# Patient Record
Sex: MALE | Race: BLACK OR AFRICAN AMERICAN
[De-identification: names, ages, dates, MRNs, and addresses within clinical notes are randomized per-mention and may not be internally consistent; named-entity substitution may affect disease eponyms.]

---

## 2017-02-01 NOTE — EMERGENCY ROOM VISIT NOTE
History


First contact with patient:  07:16


Chief Complaint:  VOMITING


Stated Complaint:  VOMITING AND PERSISTENT NAUSEA


Nursing Triage Summary:  


Pt states "I had a sore throat and I took this sore throat spray.  My 

girlfriend 


told me to swallow it and now I have been throwing up and diarrhea."  Pt 

reports 


symptoms started at 0100 this am.





History of Present Illness


The patient is a 21 year old male who presents to the Emergency Department by 

private vehicle for evaluation of his nausea, vomiting, and diarrhea.  He 

reports that last evening he had a sore throat.  He used Cepacol spray in his 

throat.  Approximate one hour later at 1 AM he developed nausea, vomiting, and 

diarrhea.  He's had persistent symptoms since.  The patient denies any fevers 

or chills.  He denies any headaches, dizziness, lightheadedness, hematemesis, 

hematochezia, melena, hematuria, or dysuria.  He denies any pain rating his 

discomfort a 0/10.  He reports no recent long distance travel, consumption of 

raw/undercooked foods, drinking from poor water sources, or recent antibiotic 

use.  He denies any recent sick contacts.  The patient reports no previous 

abdominal surgeries.





Review of Systems


A complete 10-point Review of Systems was discussed with the patient, with 

pertinent positives and negatives listed in the History of Present Illness. All 

remaining Review of Systems questions can be considered negative unless 

otherwise specified.





Social History


Smoking Status:  Never Smoker


Smokeless Tobacco Use:  No


Drug Use:  none


Marital Status:  single


Housing Status:  lives with roommate


Occupation Status:  [employer] student





Current/Historical Medications


Scheduled PRN


Ondasetron Odt (Zofran Odt), 1 TAB SL Q6 PRN for Nausea or Vomiting





Allergies


Coded Allergies:  


     No Known Allergies (Unverified , 2/1/17)





Physical Exam


Vital Signs











  Date Time  Temp Pulse Resp B/P Pulse Ox O2 Delivery O2 Flow Rate FiO2


 


2/1/17 10:45  105 17 123/85 100 Room Air  


 


2/1/17 09:18  101 16 149/100 99 Room Air  


 


2/1/17 07:13 37.1 102 16 166/91 98 Room Air  








Pain Rating (0-10):  0





Physical Exam


VITAL SIGNS - Vital signs and nursing notes were reviewed.


GENERAL - 21-year-old male appearing his stated age who is in no acute 

distress. Communicates well with provider and answers questions appropriately.


HEAD - NC/AT.


EYES - PERRL with EOMI bilaterally. Sclera anicteric. Palpebral conjunctiva 

pink and moist with no injection noted.


EARS - No deformities of external structures noted on gross examination 

bilaterally. No pain elicited with palpation of the tragus bilaterally. 

External auditory canals without discharge or otorrhea. Tympanic membranes 

pearly gray without retraction or bulging.


NOSE - Midline and without cyanosis. No epistaxis or purulent drainage noted. 

Septum midline without deviation or septal hematoma noted.


MOUTH/OROPHARYNX - Without perioral cyanosis. Buccal mucosa pink and moist and 

without leukoplakia. Tongue midline with equal elevation of palate bilaterally. 

No tonsillar hypertrophy, erythema, or exudates noted. Good dentition noted.


NECK - Neck with FROM. Supple to palpation. No nuchal rigidity.


LUNGS - Chest wall symmetric without accessory muscle use, intercostals 

retractions, or central cyanosis. Normal vesicular breath sounds CTA B/L. No 

wheezes, rales, or rhonchi appreciated.


CARDIAC - RRR with S1/S2. No murmur, rubs, or gallops appreciated.


ABDOMEN - Abdominal contour obese and without pulsations or visible masses. BS 

normoactive all four quadrants. No tenderness to palpation appreciated 

throughout. No guarding. No Rebound Tenderness. Negative Rovsing's. Negative 

Garcia's. No palpable masses, hepatosplenomegaly, or ascites noted.


PSYCH - A&Ox3 and cooperates fully with examiner. Pt is very pleasant and 

interacts well with examiner.





Medical Decision & Procedures


Laboratory Results


2/1/17 07:35








Red Blood Count 4.98, Mean Corpuscular Volume 89.6, Mean Corpuscular Hemoglobin 

30.3, Mean Corpuscular Hemoglobin Concent 33.9, Mean Platelet Volume 11.0, 

Neutrophils (%) (Auto) 71.1, Lymphocytes (%) (Auto) 20.4, Monocytes (%) (Auto) 

6.9, Eosinophils (%) (Auto) 1.2, Basophils (%) (Auto) 0.2, Neutrophils # (Auto) 

8.91, Lymphocytes # (Auto) 2.56, Monocytes # (Auto) 0.87, Eosinophils # (Auto) 

0.15, Basophils # (Auto) 0.03





2/1/17 07:35

















Test


  2/1/17


07:35 2/1/17


08:50


 


White Blood Count


  12.55 K/uL


(4.8-10.8) 


 


 


Red Blood Count


  4.98 M/uL


(4.7-6.1) 


 


 


Hemoglobin


  15.1 g/dL


(14.0-18.0) 


 


 


Hematocrit 44.6 % (42-52)  


 


Mean Corpuscular Volume


  89.6 fL


() 


 


 


Mean Corpuscular Hemoglobin


  30.3 pg


(25-34) 


 


 


Mean Corpuscular Hemoglobin


Concent 33.9 g/dl


(32-36) 


 


 


Platelet Count


  242 K/uL


(130-400) 


 


 


Mean Platelet Volume


  11.0 fL


(7.4-10.4) 


 


 


Neutrophils (%) (Auto) 71.1 %  


 


Lymphocytes (%) (Auto) 20.4 %  


 


Monocytes (%) (Auto) 6.9 %  


 


Eosinophils (%) (Auto) 1.2 %  


 


Basophils (%) (Auto) 0.2 %  


 


Neutrophils # (Auto)


  8.91 K/uL


(1.4-6.5) 


 


 


Lymphocytes # (Auto)


  2.56 K/uL


(1.2-3.4) 


 


 


Monocytes # (Auto)


  0.87 K/uL


(0.11-0.59) 


 


 


Eosinophils # (Auto)


  0.15 K/uL


(0-0.5) 


 


 


Basophils # (Auto)


  0.03 K/uL


(0-0.2) 


 


 


RDW Standard Deviation


  38.1 fL


(36.4-46.3) 


 


 


RDW Coefficient of Variation


  11.8 %


(11.5-14.5) 


 


 


Immature Granulocyte % (Auto) 0.2 %  


 


Immature Granulocyte # (Auto)


  0.03 K/uL


(0.00-0.02) 


 


 


Anion Gap


  10.0 mmol/L


(3-11) 


 


 


Est Creatinine Clear Calc


Drug Dose 131.4 ml/min 


  


 


 


Estimated GFR (


American) 112.2 


  


 


 


Estimated GFR (Non-


American 96.8 


  


 


 


BUN/Creatinine Ratio 12.1 (10-20)  


 


Calcium Level


  9.3 mg/dl


(8.5-10.1) 


 


 


Magnesium Level


  2.0 mg/dl


(1.8-2.4) 


 


 


Total Bilirubin


  1.4 mg/dl


(0.2-1) 


 


 


Aspartate Amino Transf


(AST/SGOT) 11 U/L (15-37) 


  


 


 


Alanine Aminotransferase


(ALT/SGPT) 18 U/L (12-78) 


  


 


 


Alkaline Phosphatase


  98 U/L


() 


 


 


Total Protein


  8.2 gm/dl


(6.4-8.2) 


 


 


Albumin


  4.1 gm/dl


(3.4-5.0) 


 


 


Globulin


  4.1 gm/dl


(2.5-4.0) 


 


 


Albumin/Globulin Ratio 1.0 (0.9-2)  


 


Lipase


  127 U/L


() 


 


 


Urine Color  YELLOW 


 


Urine Appearance  CLEAR (CLEAR) 


 


Urine pH  7.0 (4.5-7.5) 


 


Urine Specific Gravity


  


  1.009


(1.000-1.030)


 


Urine Protein  NEG (NEG) 


 


Urine Glucose (UA)  NEG (NEG) 


 


Urine Ketones  NEG (NEG) 


 


Urine Occult Blood  NEG (NEG) 


 


Urine Nitrite  NEG (NEG) 


 


Urine Bilirubin  NEG (NEG) 


 


Urine Urobilinogen  NEG (NEG) 


 


Urine Leukocyte Esterase  NEG (NEG) 











Medications Administered











 Medications


  (Trade)  Dose


 Ordered  Sig/Nandini


 Route  Start Time


 Stop Time Status Last Admin


Dose Admin


 


 Sodium Chloride


  (Nss 1000ml)  1,000 ml @ 


 999 mls/hr  Q1H1M STAT


 IV  2/1/17 07:46


 2/1/17 08:46 DC 2/1/17 07:46


999 MLS/HR


 


 Ondansetron HCl 4


 mg  4 mg  NOW  STAT


 IV  2/1/17 07:46


 2/1/17 07:48 DC 2/1/17 07:53


4 MG


 


 Sodium Chloride


  (Nss 1000ml)  1,000 ml @ 


 999 mls/hr  Q1H1M STAT


 IV  2/1/17 08:18


 2/1/17 09:18 DC 2/1/17 09:17


999 MLS/HR











ED Course


Patient was seen and evaluated by myself.  Labs were drawn, saline lock in 

place.  The patient was hydrated with a 1000 mL normal saline bolus.  He 

received 4 mg Zofran intravenously for nausea.  Laboratory results demonstrated 

a mild leukocytosis.  The patient is not anemic.  There are no significant 

electrolyte abnormalities.  Urinalysis is unremarkable.  The patient was 

hydrated with an additional 1000 mL normal saline.  The patient reports feeling 

much better at this time.  The patient was encouraged to continue to follow up 

with his primary care provider from today's visit.  He was educated on 

worrisome symptoms for return visit to the emergency department.  Patient 

discharged home in good condition.





Medical Decision


Given the patient's presentation and stated complaints, I did elect to perform 

the above-mentioned workup.  The patient presents today with nausea, vomiting, 

diarrhea.  He did have a sore throat.  Rapid strep sided negative.  The patient 

had a mild cytosis.  His abdomen is soft and nontender to palpation.  Patient 

is likely experiencing an acute gastritis.  He responded well to IV fluid 

resuscitation and antinausea medication.  The patient will follow-up with 

[facility] from today's visit.  He will return for any changing/

worsening symptoms.  Patient discharged home afebrile and in good condition.





In the evaluation and treatment of this patient, the following differential 

diagnoses were considered: Appendicitis, Diverticulitis, Diverticulosis, Colitis

, Ischemic Colitis, Inflammatory Bowel Disease, Irritable Bowel Disease, 

Testicular Torsion, Kidney Stone, Pyelonephritis, Hydronephrosis, Cholecystitis

, Ascending Cholangitis, Choledocholithiasis, GERD.





Impression





 Primary Impression:  


 Nausea, vomiting, and diarrhea


 Additional Impression:  


 Sore throat





Departure Information


Dispostion


Home / Self-Care





Condition


GOOD





Prescriptions





Ondasetron Odt (ZOFRAN ODT) 4 Mg Tab


1 TAB SL Q6 Y for Nausea or Vomiting for 5 Days, #20 TAB


   Prov: Tristin Gallegos, CIPRIANO         2/1/17





Referrals


No Doctor, Assigned (PCP)





Patient Instructions


ED Food Poison Or Gastroenteritis, [facility]





Additional Instructions





You have been treated in the Emergency Department your Nausea, Vomiting, and 

Diarrhea.





You have been prescribed Zofran to be used for any nausea or vomiting. Take as 

prescribed.





For pain control, you can use the following over-the-counter medicines (if >11 yo):





- Regular strength (325mg/tab) Tylenol (acetaminophen) 2 tabs every 4-6 hours 

as needed. Do not exceed 12 tablets in a 24 hour period. Avoid taking more than 

4 grams (4000 mg) of Tylenol per day. This includes any other sources of 

acetaminophen you may take on a regular basis.





- Regular strength (200 mg/tab) Advil (ibuprofen) 1-2 tabs every 4-6 hours as 

needed. Do not exceed a dose of 3200 mg per day.





Drink plenty of water and stay well hydrated. 





As with any trip to the Emergency Department, you should follow-up with your 

Primary Care Provider from today's visit.





Return to the emergency department if your symptoms persist despite treatment 

plan outlined above or if the following symptoms occur: increased fevers, chills

, worsening nausea/vomiting, blood in your stool or urine.





Problem Qualifiers

## 2017-09-14 NOTE — EMERGENCY ROOM VISIT NOTE
History


Report prepared by Myron:  Luc Trimble


Under the Supervision of:  Dr. Saida Roberts D.O.


First contact with patient:  23:22


Chief Complaint:  TACHYCARDIA


Stated Complaint:  CHEST PAIN, STOMACH PAIN, HEART BEATING FAST


Nursing Triage Summary:  


Patient reports chest and abdominal pain since tuesday. Patient reports tonight 


experienceing palpitations.





History of Present Illness


The patient is a 22 year old male who presents to the Emergency Room with 

complaints of worsening chest pain that started 2 days ago. He says that the 

pain has been constant, but he woke up with worsened pain this morning. He adds 

that he has had constant abdominal pain that started 2 days ago as well, which 

he describes as a heaviness. The patient notes that he has never had anything 

like this before. The patient says that around 6 hours ago, he had an episode 

where he felt like his heart was racing. He adds that his chest feels like it 

is "valentino". The patient notes that he vomited once yesterday. He adds 

that he ate [employer] before coming here, and says that he is nauseous. The 

patient states that the last time he ate before that was around 1300 this 

afternoon, as he had exams. He denies any shortness of breath, sore throat, 

cough, diarrhea, constipation, urinary symptoms, penis drainage, groin pain, 

leg cramping, or leg swelling. He says that he has no medical problems and does 

not take any daily medications. The patient states that he has not used any over

-the-counter medications or any supplements, but does drink vitamin water. He 

adds that he exercises around 2 to 3 times per week.





   Source of History:  patient


   Onset:  2 days ago


   Position:  chest


   Quality:  other (feels like it is "valentino")


   Timing:  worsening


   Associated Symptoms:  + nausea, + vomiting, + abdominal pain (heaviness), No 

sorethroat, No cough, No SOB, No diarrhea, No urinary symptoms


Note:


Associated symptoms: Episode of heart racing earlier. Denies constipation, 

penis drainage, groin pain, leg cramping, leg swelling.





Review of Systems


See HPI for pertinent positives & negatives. A total of 10 systems reviewed and 

were otherwise negative.





Past Medical & Surgical


Medical Problems:


(1) HTN (hypertension)


(2) No chronic diseases present








Family History





Cancer


Diabetes mellitus


Hypertension





Social History


Smoking Status:  Never Smoker


Drug Use:  none


Marital Status:  single


Housing Status:  lives with roommate


Occupation Status:  [employer] student





Current/Historical Medications


No Active Prescriptions or Reported Meds





Allergies


Coded Allergies:  


     No Known Allergies (Unverified , 9/14/17)





Physical Exam


Vital Signs











  Date Time  Temp Pulse Resp B/P (MAP) Pulse Ox O2 Delivery O2 Flow Rate FiO2


 


9/15/17 02:22  70 16 120/64 99   


 


9/15/17 00:50  94 18 150/83 98 Room Air  


 


9/14/17 23:36  92      


 


9/14/17 23:17 36.8 97 20 139/85 98 Room Air  











Physical Exam


HEENT: Head - normocephalic and atraumatic   Pupils are equal, round, and 

reactive to light.  Extraocular eye muscles are intact, and sclera are 

anicteric.   Nose -  moist nasal mucosa without discharge. Mouth - moist buccal 

mucosa.  Oropharynx is nonerythematous and there is no tonsillar exudate or 

edema noted.


Neck: Supple; no JVD, nuchal rigidity, cervical lymphadenopathy.


Heart: Regular rate and rhythm.  There is a normal S1 and S2 with no murmurs, 

clicks, or gallops appreciated.


Lungs: Clear to auscultation bilaterally with no wheezes, rales, or rhonchi.


Abdomen: Soft, moderate tenderness over McBurney's point, nondistended, with 

good bowel sounds.  There are no palpable pulsatile masses or 

hepatosplenomegaly.  There is no guarding, rigidity, or rebound noted.


Extremities: No evidence of cyanosis, clubbing, or edema.  There are easily 

palpable peripheral pulses.


Skin: warm and dry with good turgor and no rashes.





Medical Decision & Procedures


ER Provider


Diagnostic Interpretation:


CT results as stated below per my review and radiologist interpretation: 





CT ABDOMEN & PELVIS: 





Unremarkable appendix. 


No colitis or bowel obstruction.





No renal calculi or obstructive changes.





Radiologist: Manal Schoellerman, M.D.  Phone: [phone]


Study read at 00:52 and initial results transmitted at 01:15





Laboratory Results


9/14/17 23:25








Red Blood Count 5.01, Mean Corpuscular Volume 90.8, Mean Corpuscular Hemoglobin 

30.5, Mean Corpuscular Hemoglobin Concent 33.6, Mean Platelet Volume 10.8, 

Neutrophils (%) (Auto) 68.7, Lymphocytes (%) (Auto) 22.9, Monocytes (%) (Auto) 

6.0, Eosinophils (%) (Auto) 2.0, Basophils (%) (Auto) 0.2, Neutrophils # (Auto) 

8.08, Lymphocytes # (Auto) 2.69, Monocytes # (Auto) 0.70, Eosinophils # (Auto) 

0.23, Basophils # (Auto) 0.02





9/14/17 23:25

















Test


  9/14/17


23:15 9/14/17


23:25


 


Urine Color YELLOW  


 


Urine Appearance CLEAR (CLEAR)  


 


Urine pH 6.5 (4.5-7.5)  


 


Urine Specific Gravity


  1.021


(1.000-1.030) 


 


 


Urine Protein NEG (NEG)  


 


Urine Glucose (UA) NEG (NEG)  


 


Urine Ketones NEG (NEG)  


 


Urine Occult Blood NEG (NEG)  


 


Urine Nitrite NEG (NEG)  


 


Urine Bilirubin NEG (NEG)  


 


Urine Urobilinogen NEG (NEG)  


 


Urine Leukocyte Esterase NEG (NEG)  


 


White Blood Count


  


  11.74 K/uL


(4.8-10.8)


 


Red Blood Count


  


  5.01 M/uL


(4.7-6.1)


 


Hemoglobin


  


  15.3 g/dL


(14.0-18.0)


 


Hematocrit  45.5 % (42-52) 


 


Mean Corpuscular Volume


  


  90.8 fL


()


 


Mean Corpuscular Hemoglobin


  


  30.5 pg


(25-34)


 


Mean Corpuscular Hemoglobin


Concent 


  33.6 g/dl


(32-36)


 


Platelet Count


  


  283 K/uL


(130-400)


 


Mean Platelet Volume


  


  10.8 fL


(7.4-10.4)


 


Neutrophils (%) (Auto)  68.7 % 


 


Lymphocytes (%) (Auto)  22.9 % 


 


Monocytes (%) (Auto)  6.0 % 


 


Eosinophils (%) (Auto)  2.0 % 


 


Basophils (%) (Auto)  0.2 % 


 


Neutrophils # (Auto)


  


  8.08 K/uL


(1.4-6.5)


 


Lymphocytes # (Auto)


  


  2.69 K/uL


(1.2-3.4)


 


Monocytes # (Auto)


  


  0.70 K/uL


(0.11-0.59)


 


Eosinophils # (Auto)


  


  0.23 K/uL


(0-0.5)


 


Basophils # (Auto)


  


  0.02 K/uL


(0-0.2)


 


RDW Standard Deviation


  


  39.3 fL


(36.4-46.3)


 


RDW Coefficient of Variation


  


  11.7 %


(11.5-14.5)


 


Immature Granulocyte % (Auto)  0.2 % 


 


Immature Granulocyte # (Auto)


  


  0.02 K/uL


(0.00-0.02)


 


Anion Gap


  


  8.0 mmol/L


(3-11)


 


Est Creatinine Clear Calc


Drug Dose 


  139.6 ml/min 


 


 


Estimated GFR (


American) 


  111.4 


 


 


Estimated GFR (Non-


American 


  96.1 


 


 


BUN/Creatinine Ratio  13.9 (10-20) 


 


Calcium Level


  


  9.5 mg/dl


(8.5-10.1)


 


Total Bilirubin


  


  1.2 mg/dl


(0.2-1)


 


Direct Bilirubin


  


  0.2 mg/dl


(0-0.2)


 


Aspartate Amino Transf


(AST/SGOT) 


  9 U/L (15-37) 


 


 


Alanine Aminotransferase


(ALT/SGPT) 


  15 U/L (12-78) 


 


 


Alkaline Phosphatase


  


  103 U/L


()


 


Total Protein


  


  8.4 gm/dl


(6.4-8.2)


 


Albumin


  


  4.3 gm/dl


(3.4-5.0)


 


Lipase


  


  125 U/L


()





Laboratory results per my review.





ECG


Indication:  chest pain


Rate (beats per minute):  94


Rhythm:  normal sinus


Findings:  no acute ischemic change, no ectopy





ED Course


2341: Past medical records reviewed. The patient was evaluated in room B10. A 

complete history and physical exam was performed.  An IV lock was initiated and 

labs are drones above.  A urine specimen was obtained along with a twelve-lead 

EKG.  The patient will go for CT scan of the abdomen/pelvis.





0103: I reevaluated the patient and his nausea is better.





0141: I reevaluated the patient and he is feeling okay. His nausea if gone, and 

he is eating crackers and drinking ginger ale.   I reviewed laboratory studies 

and the results of his CT scan with him.  The patient verbally expressed 

understanding and agreement of the treatment plan. The patient will be 

discharged.





Medical Decision


The patient is a 22 year old male who presents to the ED with chest pain and 

abdominal pain. Differential diagnosis includes colitis, appendicitis, 

constipation, cystitis.





Lab results show a white count of 11.4, no left shift, normal renal function 

and glucose, normal lipase and LFT's, negative urinalysis.





This is a 22-year-old male patient presents to the emergency department with 

some diffuse abdominal pain and chest discomfort.  On physical exam, the patient

's pain seemed to be localized in the right lower quadrant over McBurney's 

point.  His symptoms started 2 days ago.  He does describe loss of appetite.  

He had a mild leukocytosis.  However, CT scan of the abdomen/pelvis showed no 

evidence of appendicitis.  He was symptom-free at the time of discharge.  EKG 

was unremarkable.


I've asked the patient to follow-up at the [facility] if he has 

persistent symptoms throughout the day today.  If Symptoms worsen, he should 

return here to the ER.





Medication Reconcilliation


Current Medication List:  was personally reviewed by me


No daily medications.





Blood Pressure Screening


Patient's blood pressure:  Elevated blood pressure


Blood pressure disposition:  Elevated BP felt to be situational





Impression





 Primary Impression:  


 Right lower quadrant abdominal pain


 Additional Impression:  


 Atypical chest pain





Scribe Attestation


The scribe's documentation has been prepared under my direction and personally 

reviewed by me in its entirety. I confirm that the note above accurately 

reflects all work, treatment, procedures, and medical decision making performed 

by me.





Departure Information


Dispostion


Home / Self-Care





Prescriptions





No Active Prescriptions or Reported Meds





Referrals


No Doctor, Assigned (PCP)





Forms


HOME CARE DOCUMENTATION FORM,                                                 

               IMPORTANT VISIT INFORMATION, WORK / SCHOOL INSTRUCTIONS





Patient Instructions


ED Abdominal Pain Unkn Cause Male, ED Chest Pain Atypical Unkn Cause, My [facility] 

[place] [employer]





Additional Instructions





Rest.





Take a bland diet.





Return to the ER for worsening symptoms.





Follow up by this afternoon at [facility] if symptoms continue





Problem Qualifiers

## 2017-09-15 NOTE — DIAGNOSTIC IMAGING REPORT
CT OF THE ABDOMEN AND PELVIS WITH CONTRAST



CLINICAL HISTORY: Right-sided abdominal pain. Epigastric pain.    



COMPARISON STUDY:  None.



TECHNIQUE: Following IV administration of 93 mL of Optiray-320, axial images of

the abdomen and pelvis were obtained from the lung bases to the proximal femurs.

Images were reviewed in the axial, sagittal, and coronal planes. IV contrast was

administered without complication.  A dose lowering technique was utilized

adhering to the principles of ALARA.





CT DOSE: 893.43 mGy.cm



FINDINGS: Lung bases are clear. The liver, spleen, adrenal glands, kidneys and

pancreas are normal. Caliber and wall thickness of small and large bowel are

normal. The appendix is normal. There is no hydronephrosis. No biliary or

pancreatic ductal dilatation is present. There is no lymphadenopathy. No

suspicious skeletal lesions are identified.







IMPRESSION:  No acute process within the abdomen and pelvis. Normal appendix.







Electronically signed by:  Frandy iRchey M.D.

9/15/2017 6:41 AM



Dictated Date/Time:  9/15/2017 6:38 AM

## 2018-08-05 ENCOUNTER — HOSPITAL ENCOUNTER (EMERGENCY)
Dept: HOSPITAL 45 - C.EDA | Age: 23
Discharge: HOME | End: 2018-08-05
Payer: COMMERCIAL

## 2018-08-05 VITALS — OXYGEN SATURATION: 98 %

## 2018-08-05 VITALS — OXYGEN SATURATION: 97 % | DIASTOLIC BLOOD PRESSURE: 83 MMHG | SYSTOLIC BLOOD PRESSURE: 140 MMHG | HEART RATE: 94 BPM

## 2018-08-05 VITALS — TEMPERATURE: 97.88 F
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
## 2018-08-05 DIAGNOSIS — F10.929: Primary | ICD-10-CM

## 2018-08-05 DIAGNOSIS — I10: ICD-10-CM

## 2018-08-05 LAB
BUN SERPL-MCNC: 14 MG/DL (ref 7–18)
CALCIUM SERPL-MCNC: 8.9 MG/DL (ref 8.5–10.1)
CO2 SERPL-SCNC: 22 MMOL/L (ref 21–32)
CREAT SERPL-MCNC: 1.15 MG/DL (ref 0.6–1.4)
GLUCOSE SERPL-MCNC: 125 MG/DL (ref 70–99)
POTASSIUM SERPL-SCNC: 3.1 MMOL/L (ref 3.5–5.1)
SODIUM SERPL-SCNC: 135 MMOL/L (ref 136–145)

## 2018-08-06 NOTE — EMERGENCY ROOM VISIT NOTE
ED Visit Note


First contact with patient:  02:08


CHIEF COMPLAINT: Altered mental status from  Alcohol overdose





HISTORY OF PRESENT ILLNESS: This 23 year old male patient presents to the 

emergency department via ambulance for evaluation of altered mental status, 

presumably from alcohol intoxication.  Patient was found in an alley way in 

[facility] sleeping on a curb.  He was noted to have emesis down 

the front of his shirt.  The patient was difficult to arouse for passerby's, 

and EMS was contacted.  The patient admits to drinking alcohol tonight.  He 

denies drug use.  He does not report injury or chronic medical disease.





REVIEW OF SYSTEMS: Review of systems was somewhat limited secondary to patient'

s presumed alcohol intoxication status.  Review of systems was performed to the 

best of our ability and reperformed as the patient began to sober up.  All 

other systems were reviewed and are negative.





ALLERGIES: See EMR





MEDICATIONS: See EMR





PMH: No chronic medical disease





SOCIAL HISTORY: Drinks alcohol





PHYSICAL EXAM


VITALS: Vitals are noted on the nurse's note and reviewed by myself.  Vital 

signs stable.


GENERAL: Male, who is in no acute distress and resting comfortably. Patient is 

visibly altered and smells of alcohol. 


HEAD: Normocephalic atraumatic.  


EARS: External ear normal. External auditory canals clear, tympanic membranes 

pearly gray without erythema or effusion bilaterally.


EYES: Pupils equal round and reactive to light and accommodation.  Conjunctivae 

without injection, sclerae without icterus.  Extraocular movements intact.  


NOSE: Patent, turbinates without inflammation or discharge.  


MOUTH: Mucous membranes moist.  Tonsils are not enlarged.  Pharynx without 

erythema, blood, vomitus, or exudate.  Uvula midline.  Airway patent.  


NECK: Supple without nuchal rigidity.  No lymphadenopathy.  Cervical spine is 

nontender.  


HEART: Regular rate and rhythm without murmurs gallops or rubs.


LUNGS: Clear to auscultation bilaterally without wheezes, rales or rhonchi.  No 

retractions or accessory muscle use.


ABDOMEN: Positive normal bowel sounds x 4.  Soft, nontender, without masses or 

organomegaly.  No guarding or rebound tenderness.


MUSCULOSKELETAL: No muscle atrophy, erythema, or edema noted.  Gross motor 

function intact to all extremities. 


NEURO: Patient was alert to person but not place or time. They appear with 

altered mental status. 


SKIN: The skin was without rashes, erythema, edema, or bruising. No Tenting of 

the skin.    





EMERGENCY DEPARTMENT COURSE: 


Physical exam and history was performed.  Nursing notes and EMR were reviewed.  

The patient appears to be altered on my examination.  I suspect this is from an 

alcohol overdose.  Conservative care measures  and  aspiration precautions were 

instituted.  The patient was placed on telemetry monitor and watched during the 

patient's stay.  The patient was placed in a prone position. 





Blood work was obtained and was reviewed.  The patient's blood alcohol level 

was 166. This appears to be the primary cause of the altered status. 





Patient was reevaluated multiple times throughout the course of their emergency 

department stay.  Over time the patient did sober up and was able to talk, walk

, and drink fluids without difficulty. The patient was felt stable for 

discharge home. The patient was given alcohol intoxication handouts.  The 

patient was discharged home in stable condition when sober.





Differential diagnosis:


Etiologies such as alcohol intoxication, metabolic, infection, hypoglycemia, 

electrolyte abnormalities, cardiac sources, intracerebral event, toxicologic, 

neurologic, as well as others were entertained.








DIAGNOSIS: Alcohol use with intoxication


Problem List


Medical Problems:


(1) HTN (hypertension)


Status: Resolved  





(2) No chronic diseases present


Status: Chronic  











Current/Historical Medications


Unable to Obtain Active Prescriptions or Reported Meds





Allergies


Coded Allergies:  


     No Known Allergies (Unverified , 9/14/17)





Vital Signs











  Date Time  Temp Pulse Resp B/P (MAP) Pulse Ox O2 Delivery O2 Flow Rate FiO2


 


8/5/18 06:50  94 18 140/83 97 Room Air  


 


8/5/18 05:45  94      


 


8/5/18 04:22  95 18 119/68 95 Room Air  


 


8/5/18 02:25     98 Room Air  


 


8/5/18 02:25     98 Room Air  


 


8/5/18 02:22 36.6 108 20 136/94 95 Room Air  


 


8/5/18 02:20  103      











Laboratory Results


8/5/18 02:31

















Test


  8/5/18


02:31


 


Anion Gap


  10.0 mmol/L


(3-11)


 


Estimated GFR (


American) 104.8 


 


 


Estimated GFR (Non-


American 90.5 


 


 


BUN/Creatinine Ratio 12.0 (10-20) 


 


Calcium Level


  8.9 mg/dl


(8.5-10.1)


 


Ethyl Alcohol mg/dL


  166.0 mg/dl


(0-3)











Departure Information


Impression





 Primary Impression:  


 Alcohol use with intoxication





Dispostion


Home / Self-Care





Condition


GOOD





Prescriptions





Unable to Obtain Active Prescriptions or Reported Meds





Forms


HOME CARE DOCUMENTATION FORM,                                                 

               IMPORTANT VISIT INFORMATION





Patient Instructions


My [facility], ED Alcohol Intoxication





Additional Instructions





You were seen and evaluated today on an emergency basis only.  This is not a 

substitute for, or an effort to provide, complete comprehensive medical care.  

It is not possible to recognize and treat all injuries or illnesses in a single 

emergency department visit. 





Keep well-hydrated.  Small sips of water over a long period of time are better 

tolerated than large amounts at once.





Tylenol 1000 mg every 6 hours as needed for pain (Maximum 3000 mg Tylenol in 24 

hr period).  





Follow up with family doctor as needed.





You are welcome to return to the emergency department anytime with new, 

worsening, or concerning symptoms.